# Patient Record
Sex: MALE | Race: WHITE | NOT HISPANIC OR LATINO | URBAN - METROPOLITAN AREA
[De-identification: names, ages, dates, MRNs, and addresses within clinical notes are randomized per-mention and may not be internally consistent; named-entity substitution may affect disease eponyms.]

---

## 2017-03-06 ENCOUNTER — APPOINTMENT (OUTPATIENT)
Dept: URBAN - METROPOLITAN AREA CLINIC 219 | Age: 82
Setting detail: DERMATOLOGY
End: 2017-03-06

## 2017-03-06 DIAGNOSIS — L57.0 ACTINIC KERATOSIS: ICD-10-CM

## 2017-03-06 DIAGNOSIS — L82.1 OTHER SEBORRHEIC KERATOSIS: ICD-10-CM

## 2017-03-06 DIAGNOSIS — L21.8 OTHER SEBORRHEIC DERMATITIS: ICD-10-CM

## 2017-03-06 PROBLEM — E78.5 HYPERLIPIDEMIA, UNSPECIFIED: Status: ACTIVE | Noted: 2017-03-06

## 2017-03-06 PROBLEM — I10 ESSENTIAL (PRIMARY) HYPERTENSION: Status: ACTIVE | Noted: 2017-03-06

## 2017-03-06 PROBLEM — L85.3 XEROSIS CUTIS: Status: ACTIVE | Noted: 2017-03-06

## 2017-03-06 PROBLEM — Z85.828 PERSONAL HISTORY OF OTHER MALIGNANT NEOPLASM OF SKIN: Status: ACTIVE | Noted: 2017-03-06

## 2017-03-06 PROBLEM — H91.90 UNSPECIFIED HEARING LOSS, UNSPECIFIED EAR: Status: ACTIVE | Noted: 2017-03-06

## 2017-03-06 PROCEDURE — 99213 OFFICE O/P EST LOW 20 MIN: CPT | Mod: 25

## 2017-03-06 PROCEDURE — 17000 DESTRUCT PREMALG LESION: CPT

## 2017-03-06 PROCEDURE — 17003 DESTRUCT PREMALG LES 2-14: CPT

## 2017-03-06 PROCEDURE — OTHER TREATMENT REGIMEN: OTHER

## 2017-03-06 PROCEDURE — OTHER LIQUID NITROGEN: OTHER

## 2017-03-06 PROCEDURE — OTHER REASSURANCE: OTHER

## 2017-03-06 PROCEDURE — OTHER MIPS QUALITY: OTHER

## 2017-03-06 ASSESSMENT — LOCATION SIMPLE DESCRIPTION DERM
LOCATION SIMPLE: LEFT UPPER BACK
LOCATION SIMPLE: HAIR
LOCATION SIMPLE: LEFT CHEEK
LOCATION SIMPLE: LEFT FOREHEAD

## 2017-03-06 ASSESSMENT — LOCATION DETAILED DESCRIPTION DERM
LOCATION DETAILED: LEFT CENTRAL MALAR CHEEK
LOCATION DETAILED: LEFT INFERIOR LATERAL UPPER BACK
LOCATION DETAILED: LEFT SUPERIOR FOREHEAD
LOCATION DETAILED: HAIR

## 2017-03-06 ASSESSMENT — LOCATION ZONE DERM
LOCATION ZONE: SCALP
LOCATION ZONE: FACE
LOCATION ZONE: TRUNK

## 2017-03-06 NOTE — PROCEDURE: TREATMENT REGIMEN
Detail Level: Zone
Plan: Ovace Cream twice a day as needed
Continue Regimen: Selsun Blue moisturizer with Aloe- leave in for 5 minutes and shampoo daily, alternate with Head and Shoulders Shampoo, Dove Conditioner

## 2017-09-07 ENCOUNTER — APPOINTMENT (OUTPATIENT)
Dept: URBAN - METROPOLITAN AREA CLINIC 219 | Age: 82
Setting detail: DERMATOLOGY
End: 2017-09-07

## 2017-09-07 DIAGNOSIS — L21.8 OTHER SEBORRHEIC DERMATITIS: ICD-10-CM

## 2017-09-07 DIAGNOSIS — L57.0 ACTINIC KERATOSIS: ICD-10-CM

## 2017-09-07 PROBLEM — Z85.828 PERSONAL HISTORY OF OTHER MALIGNANT NEOPLASM OF SKIN: Status: ACTIVE | Noted: 2017-09-07

## 2017-09-07 PROBLEM — E78.5 HYPERLIPIDEMIA, UNSPECIFIED: Status: ACTIVE | Noted: 2017-09-07

## 2017-09-07 PROBLEM — L29.8 OTHER PRURITUS: Status: ACTIVE | Noted: 2017-09-07

## 2017-09-07 PROCEDURE — OTHER TREATMENT REGIMEN: OTHER

## 2017-09-07 PROCEDURE — 17003 DESTRUCT PREMALG LES 2-14: CPT

## 2017-09-07 PROCEDURE — 17000 DESTRUCT PREMALG LESION: CPT

## 2017-09-07 PROCEDURE — 99213 OFFICE O/P EST LOW 20 MIN: CPT | Mod: 25

## 2017-09-07 PROCEDURE — OTHER LIQUID NITROGEN: OTHER

## 2017-09-07 ASSESSMENT — LOCATION ZONE DERM
LOCATION ZONE: FACE
LOCATION ZONE: SCALP

## 2017-09-07 ASSESSMENT — LOCATION SIMPLE DESCRIPTION DERM
LOCATION SIMPLE: HAIR
LOCATION SIMPLE: RIGHT FOREHEAD
LOCATION SIMPLE: SCALP
LOCATION SIMPLE: LEFT CHEEK

## 2017-09-07 ASSESSMENT — LOCATION DETAILED DESCRIPTION DERM
LOCATION DETAILED: HAIR
LOCATION DETAILED: LEFT CENTRAL MALAR CHEEK
LOCATION DETAILED: RIGHT SUPERIOR FOREHEAD
LOCATION DETAILED: RIGHT SUPERIOR FRONTAL SCALP

## 2017-09-07 NOTE — PROCEDURE: LIQUID NITROGEN
Render Post-Care Instructions In Note?: no
Consent: The patient's consent was obtained including but not limited to risks of crusting, scabbing, blistering, scarring, darker or lighter pigmentary change, recurrence, incomplete removal and infection.
Detail Level: Detailed
Post-Care Instructions: I reviewed with the patient in detail post-care instructions. Patient is to wear sunprotection, and avoid picking at any of the treated lesions. Pt may apply Vaseline to crusted or scabbing areas.
Number Of Freeze-Thaw Cycles: 1 freeze-thaw cycle
Duration Of Freeze Thaw-Cycle (Seconds): 0

## 2017-09-07 NOTE — PROCEDURE: TREATMENT REGIMEN
Samples Given: Ovace cream and Lotion twice a day as needed
Detail Level: Zone
Continue Regimen: Selsun Blue moisturizer with Aloe- leave in for 5 minutes and shampoo daily, alternate with Head and Shoulders Shampoo, Dove Conditioner

## 2018-03-02 ENCOUNTER — APPOINTMENT (OUTPATIENT)
Dept: URBAN - METROPOLITAN AREA CLINIC 219 | Age: 83
Setting detail: DERMATOLOGY
End: 2018-03-02

## 2018-03-02 DIAGNOSIS — L82.0 INFLAMED SEBORRHEIC KERATOSIS: ICD-10-CM

## 2018-03-02 DIAGNOSIS — L57.0 ACTINIC KERATOSIS: ICD-10-CM

## 2018-03-02 DIAGNOSIS — L21.8 OTHER SEBORRHEIC DERMATITIS: ICD-10-CM

## 2018-03-02 PROCEDURE — OTHER MIPS QUALITY: OTHER

## 2018-03-02 PROCEDURE — 17003 DESTRUCT PREMALG LES 2-14: CPT

## 2018-03-02 PROCEDURE — 17110 DESTRUCT B9 LESION 1-14: CPT

## 2018-03-02 PROCEDURE — OTHER TREATMENT REGIMEN: OTHER

## 2018-03-02 PROCEDURE — OTHER LIQUID NITROGEN: OTHER

## 2018-03-02 PROCEDURE — 17000 DESTRUCT PREMALG LESION: CPT | Mod: 59

## 2018-03-02 PROCEDURE — 99213 OFFICE O/P EST LOW 20 MIN: CPT | Mod: 25

## 2018-03-02 ASSESSMENT — LOCATION SIMPLE DESCRIPTION DERM
LOCATION SIMPLE: LEFT ZYGOMA
LOCATION SIMPLE: RIGHT FOREHEAD
LOCATION SIMPLE: LEFT CLAVICULAR SKIN
LOCATION SIMPLE: LEFT CHEEK
LOCATION SIMPLE: LEFT TEMPLE
LOCATION SIMPLE: HAIR
LOCATION SIMPLE: CHEST

## 2018-03-02 ASSESSMENT — LOCATION ZONE DERM
LOCATION ZONE: TRUNK
LOCATION ZONE: SCALP
LOCATION ZONE: FACE

## 2018-03-02 ASSESSMENT — LOCATION DETAILED DESCRIPTION DERM
LOCATION DETAILED: HAIR
LOCATION DETAILED: LEFT MEDIAL ZYGOMA
LOCATION DETAILED: LEFT CENTRAL TEMPLE
LOCATION DETAILED: LEFT CLAVICULAR SKIN
LOCATION DETAILED: STERNUM
LOCATION DETAILED: RIGHT FOREHEAD
LOCATION DETAILED: LEFT CENTRAL MALAR CHEEK

## 2018-03-02 NOTE — PROCEDURE: LIQUID NITROGEN
Add 52 Modifier (Optional): no
Medical Necessity Clause: This procedure was medically necessary because the lesions that were treated were:
Post-Care Instructions: I reviewed with the patient in detail post-care instructions. Patient is to wear sunprotection, and avoid picking at any of the treated lesions. Pt may apply Vaseline to crusted or scabbing areas.
Consent: The patient's consent was obtained including but not limited to risks of crusting, scabbing, blistering, scarring, darker or lighter pigmentary change, recurrence, incomplete removal and infection.
Detail Level: Detailed
Medical Necessity Information: It is in your best interest to select a reason for this procedure from the list below. All of these items fulfill various CMS LCD requirements except the new and changing color options.
Duration Of Freeze Thaw-Cycle (Seconds): 0

## 2018-09-06 ENCOUNTER — APPOINTMENT (OUTPATIENT)
Dept: URBAN - METROPOLITAN AREA CLINIC 219 | Age: 83
Setting detail: DERMATOLOGY
End: 2018-09-06

## 2018-09-06 DIAGNOSIS — D17 BENIGN LIPOMATOUS NEOPLASM: ICD-10-CM

## 2018-09-06 DIAGNOSIS — L21.8 OTHER SEBORRHEIC DERMATITIS: ICD-10-CM

## 2018-09-06 PROBLEM — D17.22 BENIGN LIPOMATOUS NEOPLASM OF SKIN AND SUBCUTANEOUS TISSUE OF LEFT ARM: Status: ACTIVE | Noted: 2018-09-06

## 2018-09-06 PROCEDURE — OTHER REASSURANCE: OTHER

## 2018-09-06 PROCEDURE — OTHER TREATMENT REGIMEN: OTHER

## 2018-09-06 PROCEDURE — 99213 OFFICE O/P EST LOW 20 MIN: CPT

## 2018-09-06 ASSESSMENT — LOCATION DETAILED DESCRIPTION DERM
LOCATION DETAILED: LEFT PROXIMAL DORSAL FOREARM
LOCATION DETAILED: LEFT CENTRAL MALAR CHEEK
LOCATION DETAILED: HAIR

## 2018-09-06 ASSESSMENT — LOCATION SIMPLE DESCRIPTION DERM
LOCATION SIMPLE: LEFT CHEEK
LOCATION SIMPLE: HAIR
LOCATION SIMPLE: LEFT FOREARM

## 2018-09-06 ASSESSMENT — LOCATION ZONE DERM
LOCATION ZONE: ARM
LOCATION ZONE: SCALP
LOCATION ZONE: FACE

## 2018-09-06 NOTE — PROCEDURE: TREATMENT REGIMEN
Continue Regimen: Selsun Blue moisturizer with Aloe- leave in for 5 minutes and shampoo daily, alternate with Head and Shoulders Shampoo, Dove Conditioner
Detail Level: Zone
Samples Given: Ovace foam twice a day as needed

## 2019-03-04 ENCOUNTER — APPOINTMENT (OUTPATIENT)
Dept: URBAN - METROPOLITAN AREA CLINIC 219 | Age: 84
Setting detail: DERMATOLOGY
End: 2019-03-04

## 2019-03-04 DIAGNOSIS — L21.8 OTHER SEBORRHEIC DERMATITIS: ICD-10-CM

## 2019-03-04 DIAGNOSIS — L57.0 ACTINIC KERATOSIS: ICD-10-CM

## 2019-03-04 PROBLEM — Z85.828 PERSONAL HISTORY OF OTHER MALIGNANT NEOPLASM OF SKIN: Status: ACTIVE | Noted: 2019-03-04

## 2019-03-04 PROBLEM — E13.9 OTHER SPECIFIED DIABETES MELLITUS WITHOUT COMPLICATIONS: Status: ACTIVE | Noted: 2019-03-04

## 2019-03-04 PROCEDURE — OTHER TREATMENT REGIMEN: OTHER

## 2019-03-04 PROCEDURE — OTHER LIQUID NITROGEN: OTHER

## 2019-03-04 PROCEDURE — 17000 DESTRUCT PREMALG LESION: CPT

## 2019-03-04 PROCEDURE — OTHER MIPS QUALITY: OTHER

## 2019-03-04 PROCEDURE — 99212 OFFICE O/P EST SF 10 MIN: CPT | Mod: 25

## 2019-03-04 PROCEDURE — 17003 DESTRUCT PREMALG LES 2-14: CPT

## 2019-03-04 ASSESSMENT — LOCATION DETAILED DESCRIPTION DERM
LOCATION DETAILED: RIGHT SUPERIOR HELIX
LOCATION DETAILED: LEFT CENTRAL MALAR CHEEK
LOCATION DETAILED: LEFT SUPERIOR PARIETAL SCALP
LOCATION DETAILED: LEFT CENTRAL TEMPLE
LOCATION DETAILED: HAIR
LOCATION DETAILED: RIGHT MEDIAL ZYGOMA
LOCATION DETAILED: RIGHT SUPERIOR MEDIAL MALAR CHEEK
LOCATION DETAILED: RIGHT CLAVICULAR NECK
LOCATION DETAILED: RIGHT MEDIAL FRONTAL SCALP
LOCATION DETAILED: LEFT SUPERIOR FRONTAL SCALP

## 2019-03-04 ASSESSMENT — LOCATION SIMPLE DESCRIPTION DERM
LOCATION SIMPLE: HAIR
LOCATION SIMPLE: LEFT TEMPLE
LOCATION SIMPLE: LEFT CHEEK
LOCATION SIMPLE: RIGHT ZYGOMA
LOCATION SIMPLE: RIGHT CHEEK
LOCATION SIMPLE: RIGHT SCALP
LOCATION SIMPLE: RIGHT EAR
LOCATION SIMPLE: RIGHT ANTERIOR NECK
LOCATION SIMPLE: SCALP

## 2019-03-04 ASSESSMENT — LOCATION ZONE DERM
LOCATION ZONE: FACE
LOCATION ZONE: EAR
LOCATION ZONE: SCALP
LOCATION ZONE: NECK

## 2019-03-04 NOTE — PROCEDURE: TREATMENT REGIMEN
Plan: Plexion Cream once a day as needed
Detail Level: Zone
Continue Regimen: Selsun Blue Moisturizing Shampoo with Aloe- leave in for 5 minutes and shampoo daily, alternate with Head and Shoulders Shampoo, Dove Conditioner
Samples Given: Plexion Cream

## 2019-03-04 NOTE — PROCEDURE: MIPS QUALITY
Quality 110: Preventive Care And Screening: Influenza Immunization: Influenza Immunization previously received during influenza season
Quality 474: Zoster Vaccination Status: Shingrix Vaccination not Administered or Previously Received, Reason not Otherwise Specified
Detail Level: Detailed

## 2019-08-28 ENCOUNTER — APPOINTMENT (OUTPATIENT)
Dept: URBAN - METROPOLITAN AREA CLINIC 219 | Age: 84
Setting detail: DERMATOLOGY
End: 2019-08-28

## 2019-08-28 DIAGNOSIS — L82.0 INFLAMED SEBORRHEIC KERATOSIS: ICD-10-CM

## 2019-08-28 DIAGNOSIS — L21.8 OTHER SEBORRHEIC DERMATITIS: ICD-10-CM

## 2019-08-28 DIAGNOSIS — L57.0 ACTINIC KERATOSIS: ICD-10-CM

## 2019-08-28 PROBLEM — L29.8 OTHER PRURITUS: Status: ACTIVE | Noted: 2019-08-28

## 2019-08-28 PROBLEM — E13.9 OTHER SPECIFIED DIABETES MELLITUS WITHOUT COMPLICATIONS: Status: ACTIVE | Noted: 2019-08-28

## 2019-08-28 PROBLEM — D48.5 NEOPLASM OF UNCERTAIN BEHAVIOR OF SKIN: Status: ACTIVE | Noted: 2019-08-28

## 2019-08-28 PROCEDURE — 17000 DESTRUCT PREMALG LESION: CPT | Mod: 59

## 2019-08-28 PROCEDURE — 17003 DESTRUCT PREMALG LES 2-14: CPT | Mod: 59

## 2019-08-28 PROCEDURE — OTHER TREATMENT REGIMEN: OTHER

## 2019-08-28 PROCEDURE — OTHER DEFER: OTHER

## 2019-08-28 PROCEDURE — 99213 OFFICE O/P EST LOW 20 MIN: CPT | Mod: 25

## 2019-08-28 PROCEDURE — OTHER MIPS QUALITY: OTHER

## 2019-08-28 PROCEDURE — OTHER LIQUID NITROGEN: OTHER

## 2019-08-28 PROCEDURE — 17110 DESTRUCT B9 LESION 1-14: CPT

## 2019-08-28 ASSESSMENT — LOCATION DETAILED DESCRIPTION DERM
LOCATION DETAILED: LEFT INFERIOR LATERAL MALAR CHEEK
LOCATION DETAILED: LEFT LATERAL MANDIBULAR CHEEK
LOCATION DETAILED: LEFT CENTRAL FRONTAL SCALP
LOCATION DETAILED: LEFT CENTRAL MALAR CHEEK
LOCATION DETAILED: RIGHT SUPERIOR PARIETAL SCALP
LOCATION DETAILED: RIGHT CENTRAL FRONTAL SCALP
LOCATION DETAILED: HAIR

## 2019-08-28 ASSESSMENT — LOCATION SIMPLE DESCRIPTION DERM
LOCATION SIMPLE: LEFT SCALP
LOCATION SIMPLE: SCALP
LOCATION SIMPLE: RIGHT SCALP
LOCATION SIMPLE: HAIR
LOCATION SIMPLE: LEFT CHEEK

## 2019-08-28 ASSESSMENT — LOCATION ZONE DERM
LOCATION ZONE: FACE
LOCATION ZONE: SCALP

## 2019-08-28 NOTE — PROCEDURE: LIQUID NITROGEN
Render Post-Care Instructions In Note?: no
Duration Of Freeze Thaw-Cycle (Seconds): 0
Detail Level: Detailed
Consent: The patient's consent was obtained including but not limited to risks of crusting, scabbing, blistering, scarring, darker or lighter pigmentary change, recurrence, incomplete removal and infection.
Medical Necessity Clause: This procedure was medically necessary because the lesions that were treated were:
Post-Care Instructions: I reviewed with the patient in detail post-care instructions. Patient is to wear sunprotection, and avoid picking at any of the treated lesions. Pt may apply Vaseline to crusted or scabbing areas.
Medical Necessity Information: It is in your best interest to select a reason for this procedure from the list below. All of these items fulfill various CMS LCD requirements except the new and changing color options.

## 2019-08-28 NOTE — PROCEDURE: TREATMENT REGIMEN
Detail Level: Zone
Samples Given: Plexion Cream
Continue Regimen: Selsun Blue Moisturizing Shampoo with Aloe- leave in for 5 minutes and shampoo daily, alternate with Head and Shoulders Shampoo, Dove Conditioner\\nPlexion Cream once a day as needed

## 2019-08-28 NOTE — PROCEDURE: DEFER
Introduction Text (Please End With A Colon): The following procedure was deferred:
Detail Level: Simple
Other Procedure: Shave Removal/Destruction

## 2019-10-09 ENCOUNTER — APPOINTMENT (OUTPATIENT)
Dept: URBAN - METROPOLITAN AREA CLINIC 219 | Age: 84
Setting detail: DERMATOLOGY
End: 2019-10-09

## 2019-10-09 DIAGNOSIS — L57.0 ACTINIC KERATOSIS: ICD-10-CM

## 2019-10-09 PROBLEM — E13.9 OTHER SPECIFIED DIABETES MELLITUS WITHOUT COMPLICATIONS: Status: ACTIVE | Noted: 2019-10-09

## 2019-10-09 PROCEDURE — OTHER LIQUID NITROGEN: OTHER

## 2019-10-09 PROCEDURE — 17000 DESTRUCT PREMALG LESION: CPT

## 2019-10-09 ASSESSMENT — LOCATION ZONE DERM: LOCATION ZONE: FACE

## 2019-10-09 ASSESSMENT — LOCATION DETAILED DESCRIPTION DERM: LOCATION DETAILED: RIGHT INFERIOR CENTRAL MALAR CHEEK

## 2019-10-09 ASSESSMENT — LOCATION SIMPLE DESCRIPTION DERM: LOCATION SIMPLE: RIGHT CHEEK

## 2019-10-16 NOTE — PROCEDURE: TREATMENT REGIMEN
H&P Update: 
Fiorella Coles was seen and examined. History and physical has been reviewed. The patient has been examined.  There have been no significant clinical changes since the completion of the originally dated History and Physical. 
 
 

Continue Regimen: Selsun Blue moisturizer with Aloe- leave in for 5 minutes and shampoo daily, alternate with Head and Shoulders Shampoo, Dove Conditioner
Samples Given: Ovace cream and Lotion twice a day as needed
Detail Level: Zone

## 2020-04-09 ENCOUNTER — APPOINTMENT (OUTPATIENT)
Dept: URBAN - NONMETROPOLITAN AREA CLINIC 45 | Age: 85
Setting detail: DERMATOLOGY
End: 2020-04-09

## 2020-04-09 DIAGNOSIS — L24 IRRITANT CONTACT DERMATITIS: ICD-10-CM

## 2020-04-09 PROBLEM — L24.9 IRRITANT CONTACT DERMATITIS, UNSPECIFIED CAUSE: Status: ACTIVE | Noted: 2020-04-09

## 2020-04-09 PROBLEM — I10 ESSENTIAL (PRIMARY) HYPERTENSION: Status: ACTIVE | Noted: 2020-04-09

## 2020-04-09 PROBLEM — L29.8 OTHER PRURITUS: Status: ACTIVE | Noted: 2020-04-09

## 2020-04-09 PROBLEM — E13.9 OTHER SPECIFIED DIABETES MELLITUS WITHOUT COMPLICATIONS: Status: ACTIVE | Noted: 2020-04-09

## 2020-04-09 PROCEDURE — OTHER PRESCRIPTION: OTHER

## 2020-04-09 PROCEDURE — OTHER TREATMENT REGIMEN: OTHER

## 2020-04-09 PROCEDURE — OTHER MIPS QUALITY: OTHER

## 2020-04-09 PROCEDURE — 99213 OFFICE O/P EST LOW 20 MIN: CPT

## 2020-04-09 RX ORDER — MUPIROCIN 20 MG/G
APPLY OINTMENT TOPICAL BID
Qty: 1 | Refills: 5 | Status: ERX | COMMUNITY
Start: 2020-04-09

## 2020-04-09 ASSESSMENT — LOCATION SIMPLE DESCRIPTION DERM
LOCATION SIMPLE: LEFT BUTTOCK
LOCATION SIMPLE: RIGHT BUTTOCK

## 2020-04-09 ASSESSMENT — LOCATION DETAILED DESCRIPTION DERM
LOCATION DETAILED: LEFT BUTTOCK
LOCATION DETAILED: RIGHT BUTTOCK

## 2020-04-09 ASSESSMENT — LOCATION ZONE DERM: LOCATION ZONE: TRUNK

## 2020-04-09 NOTE — PROCEDURE: TREATMENT REGIMEN
Plan: Mupirocin ointment twice a day \\nCoat over top with Dr. Bryan greenbegrer or adult care ointment or Desitin cream \\nAvoid pressure if possible \\nRecommend patient to sit on a gel cushion or donut pillow Plan: Mupirocin ointment twice a day \\nCoat over top with Dr. Bryan greenberger or adult care ointment or Desitin cream \\nAvoid pressure if possible \\nRecommend patient to sit on a gel cushion or donut pillow

## 2020-04-09 NOTE — PROCEDURE: MIPS QUALITY
Quality 111:Pneumonia Vaccination Status For Older Adults: Pneumococcal Vaccination Previously Received
Quality 474: Zoster Vaccination Status: Shingrix Vaccination not Administered or Previously Received, Reason not Otherwise Specified
Quality 110: Preventive Care And Screening: Influenza Immunization: Influenza Immunization Administered during Influenza season
Detail Level: Detailed

## 2021-05-06 ENCOUNTER — APPOINTMENT (OUTPATIENT)
Dept: URBAN - NONMETROPOLITAN AREA CLINIC 45 | Age: 86
Setting detail: DERMATOLOGY
End: 2021-05-06

## 2021-05-06 DIAGNOSIS — L21.8 OTHER SEBORRHEIC DERMATITIS: ICD-10-CM

## 2021-05-06 DIAGNOSIS — L57.0 ACTINIC KERATOSIS: ICD-10-CM

## 2021-05-06 PROBLEM — L29.8 OTHER PRURITUS: Status: ACTIVE | Noted: 2021-05-06

## 2021-05-06 PROCEDURE — OTHER LIQUID NITROGEN: OTHER

## 2021-05-06 PROCEDURE — 17000 DESTRUCT PREMALG LESION: CPT

## 2021-05-06 PROCEDURE — 99213 OFFICE O/P EST LOW 20 MIN: CPT | Mod: 25

## 2021-05-06 PROCEDURE — OTHER TREATMENT REGIMEN: OTHER

## 2021-05-06 PROCEDURE — 17003 DESTRUCT PREMALG LES 2-14: CPT

## 2021-05-06 PROCEDURE — OTHER PRESCRIPTION: OTHER

## 2021-05-06 RX ORDER — ECONAZOLE NITRATE 10 MG/G
APPLY CREAM TOPICAL
Qty: 1 | Refills: 5 | COMMUNITY
Start: 2021-05-06

## 2021-05-06 ASSESSMENT — LOCATION DETAILED DESCRIPTION DERM
LOCATION DETAILED: LEFT SUPERIOR MEDIAL FOREHEAD
LOCATION DETAILED: LEFT SUPERIOR FOREHEAD
LOCATION DETAILED: RIGHT POSTERIOR PARIETAL SCALP
LOCATION DETAILED: LEFT SUPERIOR OCCIPITAL SCALP

## 2021-05-06 ASSESSMENT — LOCATION SIMPLE DESCRIPTION DERM
LOCATION SIMPLE: POSTERIOR SCALP
LOCATION SIMPLE: LEFT OCCIPITAL SCALP
LOCATION SIMPLE: LEFT FOREHEAD

## 2021-05-06 ASSESSMENT — LOCATION ZONE DERM
LOCATION ZONE: FACE
LOCATION ZONE: SCALP

## 2021-05-06 NOTE — PROCEDURE: TREATMENT REGIMEN
Detail Level: Simple
Plan: Econazole cream twice a day as needed for facial and ear scaling \\nAlternate Head and shoulders Shampoo and Nizoral shampoo

## 2022-01-20 ENCOUNTER — APPOINTMENT (OUTPATIENT)
Dept: URBAN - NONMETROPOLITAN AREA CLINIC 45 | Age: 87
Setting detail: DERMATOLOGY
End: 2022-01-20

## 2022-01-20 DIAGNOSIS — L24 IRRITANT CONTACT DERMATITIS: ICD-10-CM

## 2022-01-20 DIAGNOSIS — L21.8 OTHER SEBORRHEIC DERMATITIS: ICD-10-CM

## 2022-01-20 PROBLEM — H91.90 UNSPECIFIED HEARING LOSS, UNSPECIFIED EAR: Status: ACTIVE | Noted: 2022-01-20

## 2022-01-20 PROBLEM — E78.5 HYPERLIPIDEMIA, UNSPECIFIED: Status: ACTIVE | Noted: 2022-01-20

## 2022-01-20 PROBLEM — L24.9 IRRITANT CONTACT DERMATITIS, UNSPECIFIED CAUSE: Status: ACTIVE | Noted: 2022-01-20

## 2022-01-20 PROBLEM — E13.9 OTHER SPECIFIED DIABETES MELLITUS WITHOUT COMPLICATIONS: Status: ACTIVE | Noted: 2022-01-20

## 2022-01-20 PROBLEM — Z85.828 PERSONAL HISTORY OF OTHER MALIGNANT NEOPLASM OF SKIN: Status: ACTIVE | Noted: 2022-01-20

## 2022-01-20 PROCEDURE — OTHER MIPS QUALITY: OTHER

## 2022-01-20 PROCEDURE — OTHER TREATMENT REGIMEN: OTHER

## 2022-01-20 PROCEDURE — 99213 OFFICE O/P EST LOW 20 MIN: CPT

## 2022-01-20 PROCEDURE — OTHER PRESCRIPTION: OTHER

## 2022-01-20 RX ORDER — KETOCONAZOLE 20 MG/G
APPLY CREAM TOPICAL BID
Qty: 60 | Refills: 5 | Status: ERX | COMMUNITY
Start: 2022-01-20

## 2022-01-20 RX ORDER — HYDROCORTISONE 25 MG/G
APPLY CREAM TOPICAL
Qty: 28 | Refills: 5 | Status: ERX | COMMUNITY
Start: 2022-01-20

## 2022-01-20 RX ORDER — MUPIROCIN 20 MG/G
OINTMENT TOPICAL BID
Qty: 22 | Refills: 5 | Status: ERX

## 2022-01-20 ASSESSMENT — LOCATION DETAILED DESCRIPTION DERM
LOCATION DETAILED: LEFT BUTTOCK
LOCATION DETAILED: RIGHT BUTTOCK

## 2022-01-20 ASSESSMENT — LOCATION ZONE DERM: LOCATION ZONE: TRUNK

## 2022-01-20 ASSESSMENT — LOCATION SIMPLE DESCRIPTION DERM
LOCATION SIMPLE: LEFT BUTTOCK
LOCATION SIMPLE: RIGHT BUTTOCK

## 2022-01-20 NOTE — PROCEDURE: TREATMENT REGIMEN
Continue Regimen: Alternate Head and shoulders Shampoo and Selsun Blue Moisturizing Shampoo with Aloe (increase shampooing to daily)
Detail Level: Simple
Plan: Mupirocin ointment twice a day \\nCoat over top with Dr. Bryan greenberger or adult care ointment or Desitin cream \\nAvoid pressure if possible \\nRecommend patient to sit on a gel cushion or donut pillow
Plan: Change to Ketoconazole Cream twice a day\\nAdd Hydrocortisone Cream 2.5% 1-2 times a week as needed

## 2022-07-14 ENCOUNTER — APPOINTMENT (OUTPATIENT)
Dept: URBAN - NONMETROPOLITAN AREA CLINIC 45 | Age: 87
Setting detail: DERMATOLOGY
End: 2022-07-14

## 2022-07-14 DIAGNOSIS — L21.8 OTHER SEBORRHEIC DERMATITIS: ICD-10-CM

## 2022-07-14 DIAGNOSIS — L57.0 ACTINIC KERATOSIS: ICD-10-CM

## 2022-07-14 PROBLEM — L85.3 XEROSIS CUTIS: Status: ACTIVE | Noted: 2022-07-14

## 2022-07-14 PROBLEM — H91.90 UNSPECIFIED HEARING LOSS, UNSPECIFIED EAR: Status: ACTIVE | Noted: 2022-07-14

## 2022-07-14 PROBLEM — I10 ESSENTIAL (PRIMARY) HYPERTENSION: Status: ACTIVE | Noted: 2022-07-14

## 2022-07-14 PROBLEM — Z85.828 PERSONAL HISTORY OF OTHER MALIGNANT NEOPLASM OF SKIN: Status: ACTIVE | Noted: 2022-07-14

## 2022-07-14 PROCEDURE — 99213 OFFICE O/P EST LOW 20 MIN: CPT | Mod: 25

## 2022-07-14 PROCEDURE — OTHER MIPS QUALITY: OTHER

## 2022-07-14 PROCEDURE — 17003 DESTRUCT PREMALG LES 2-14: CPT

## 2022-07-14 PROCEDURE — 17000 DESTRUCT PREMALG LESION: CPT

## 2022-07-14 PROCEDURE — OTHER LIQUID NITROGEN: OTHER

## 2022-07-14 PROCEDURE — OTHER TREATMENT REGIMEN: OTHER

## 2022-07-14 ASSESSMENT — LOCATION DETAILED DESCRIPTION DERM
LOCATION DETAILED: RIGHT FOREHEAD
LOCATION DETAILED: LEFT SUPERIOR OCCIPITAL SCALP

## 2022-07-14 ASSESSMENT — LOCATION ZONE DERM
LOCATION ZONE: SCALP
LOCATION ZONE: FACE

## 2022-07-14 ASSESSMENT — LOCATION SIMPLE DESCRIPTION DERM
LOCATION SIMPLE: SCALP
LOCATION SIMPLE: RIGHT FOREHEAD

## 2022-07-14 NOTE — PROCEDURE: TREATMENT REGIMEN
Continue Regimen: Alternate Head and shoulders Shampoo and Selsun Blue Moisturizing Shampoo with Aloe (shampoo daily)\\nKetoconazole Cream twice a day\\nHydrocortisone Cream 2.5% 1-2 times a week as needed
Detail Level: Simple
Patient

## 2022-07-14 NOTE — PROCEDURE: LIQUID NITROGEN
Post-Care Instructions: I reviewed with the patient in detail post-care instructions. Patient is to wear sunprotection, and avoid picking at any of the treated lesions. Pt may apply Vaseline to crusted or scabbing areas.
Detail Level: Detailed
Show Applicator Variable?: Yes
Render Note In Bullet Format When Appropriate: No
Duration Of Freeze Thaw-Cycle (Seconds): 0
Consent: The patient's consent was obtained including but not limited to risks of crusting, scabbing, blistering, scarring, darker or lighter pigmentary change, recurrence, incomplete removal and infection.

## 2023-11-02 ENCOUNTER — APPOINTMENT (OUTPATIENT)
Dept: URBAN - NONMETROPOLITAN AREA CLINIC 45 | Age: 88
Setting detail: DERMATOLOGY
End: 2023-11-02

## 2023-11-02 DIAGNOSIS — L57.0 ACTINIC KERATOSIS: ICD-10-CM

## 2023-11-02 PROCEDURE — 17003 DESTRUCT PREMALG LES 2-14: CPT

## 2023-11-02 PROCEDURE — OTHER LIQUID NITROGEN: OTHER

## 2023-11-02 PROCEDURE — OTHER MIPS QUALITY: OTHER

## 2023-11-02 PROCEDURE — 17000 DESTRUCT PREMALG LESION: CPT

## 2023-11-02 ASSESSMENT — LOCATION SIMPLE DESCRIPTION DERM
LOCATION SIMPLE: LEFT FOREHEAD
LOCATION SIMPLE: LEFT ZYGOMA
LOCATION SIMPLE: SUPERIOR FOREHEAD

## 2023-11-02 ASSESSMENT — LOCATION ZONE DERM: LOCATION ZONE: FACE

## 2023-11-02 ASSESSMENT — LOCATION DETAILED DESCRIPTION DERM
LOCATION DETAILED: SUPERIOR MID FOREHEAD
LOCATION DETAILED: LEFT SUPERIOR FOREHEAD
LOCATION DETAILED: LEFT MEDIAL ZYGOMA

## 2023-11-02 NOTE — PROCEDURE: LIQUID NITROGEN
Post-Care Instructions: I reviewed with the patient in detail post-care instructions. Patient is to wear sunprotection, and avoid picking at any of the treated lesions. Pt may apply Vaseline to crusted or scabbing areas.
Detail Level: Detailed
Render Note In Bullet Format When Appropriate: No
Number Of Freeze-Thaw Cycles: 1 freeze-thaw cycle
Duration Of Freeze Thaw-Cycle (Seconds): 0
Consent: The patient's consent was obtained including but not limited to risks of crusting, scabbing, blistering, scarring, darker or lighter pigmentary change, recurrence, incomplete removal and infection.
Show Applicator Variable?: Yes

## 2023-11-02 NOTE — PROCEDURE: MIPS QUALITY
Quality 47: Advance Care Plan: Advance Care Planning discussed and documented in the medical record; patient did not wish or was not able to name a surrogate decision maker or provide an advance care plan.
Quality 110: Preventive Care And Screening: Influenza Immunization: Influenza Immunization previously received during influenza season
Quality 431: Preventive Care And Screening: Unhealthy Alcohol Use - Screening: Patient not identified as an unhealthy alcohol user when screened for unhealthy alcohol use using a systematic screening method
Quality 130: Documentation Of Current Medications In The Medical Record: Current Medications Documented
Detail Level: Detailed
Quality 111:Pneumonia Vaccination Status For Older Adults: Patient received any pneumococcal conjugate or polysaccharide vaccine on or after their 60th birthday and before the end of the measurement period
Quality 226: Preventive Care And Screening: Tobacco Use: Screening And Cessation Intervention: Patient screened for tobacco use and is an ex/non-smoker

## 2024-03-28 ENCOUNTER — APPOINTMENT (OUTPATIENT)
Dept: URBAN - NONMETROPOLITAN AREA CLINIC 45 | Age: 89
Setting detail: DERMATOLOGY
End: 2024-03-31

## 2024-03-28 DIAGNOSIS — L82.0 INFLAMED SEBORRHEIC KERATOSIS: ICD-10-CM

## 2024-03-28 DIAGNOSIS — L82.1 OTHER SEBORRHEIC KERATOSIS: ICD-10-CM

## 2024-03-28 PROCEDURE — OTHER MIPS QUALITY: OTHER

## 2024-03-28 PROCEDURE — OTHER COUNSELING: OTHER

## 2024-03-28 PROCEDURE — 17110 DESTRUCT B9 LESION 1-14: CPT

## 2024-03-28 PROCEDURE — 99212 OFFICE O/P EST SF 10 MIN: CPT | Mod: 25

## 2024-03-28 PROCEDURE — OTHER LIQUID NITROGEN: OTHER

## 2024-03-28 ASSESSMENT — LOCATION ZONE DERM
LOCATION ZONE: FACE
LOCATION ZONE: TRUNK
LOCATION ZONE: SCALP
LOCATION ZONE: NECK

## 2024-03-28 ASSESSMENT — LOCATION DETAILED DESCRIPTION DERM
LOCATION DETAILED: RIGHT MEDIAL MALAR CHEEK
LOCATION DETAILED: RIGHT CENTRAL POSTAURICULAR SKIN
LOCATION DETAILED: RIGHT SUPERIOR LATERAL NECK
LOCATION DETAILED: SUPERIOR LUMBAR SPINE

## 2024-03-28 ASSESSMENT — LOCATION SIMPLE DESCRIPTION DERM
LOCATION SIMPLE: NECK
LOCATION SIMPLE: LOWER BACK
LOCATION SIMPLE: SCALP
LOCATION SIMPLE: RIGHT CHEEK

## 2024-03-28 NOTE — PROCEDURE: LIQUID NITROGEN
Number Of Freeze-Thaw Cycles: 3 freeze-thaw cycles
Consent: The patient's verbal consent was obtained including but not limited to risks of crusting, scabbing, blistering, scarring, darker or lighter pigmentary change, recurrence, incomplete removal and infection.
Medical Necessity Information: It is in your best interest to select a reason for this procedure from the list below. All of these items fulfill various CMS LCD requirements except the new and changing color options.
Show Applicator Variable?: Yes
Add 52 Modifier (Optional): no
Medical Necessity Clause: This procedure was medically necessary because the lesions that were treated were:
Detail Level: Detailed
Spray Paint Text: The liquid nitrogen was applied to the skin utilizing a spray paint frosting technique.
Post-Care Instructions: I reviewed with the patient in detail post-care instructions. Patient is to wear sunprotection, and avoid picking at any of the treated lesions. Pt may apply Vaseline to crusted or scabbing areas.